# Patient Record
(demographics unavailable — no encounter records)

---

## 2021-01-11 NOTE — ULT
PRELIMINARY REPORT/[facility]/EMERGENCY AFTER HOURS PROCEDURE:



EXAM: US Duplex bilateral Lower Extremity Veins. 



CLINICAL HISTORY: HX: BLE PAIN. SEE NOTES ON LAST IMAGE. THANKS 



TECHNIQUE: Real-time ultrasound scan of the veins of the bilateral lower extremity with color Doppler
 flow, spectral waveform analysis and compression. 



COMPARISON: None provided. 



FINDINGS: 

DEEP VEINS: The common femoral, femoral, and popliteal veins are echolucent and compressible. These v
essels demonstrate respiratory variation and augmentation. There is normal color Doppler flow

throughout. The visualized calf veins are also patent. 

SUPERFICIAL VEINS: The visualized greater saphenous vein is patent. 

SOFT TISSUES: No popliteal fossa cyst or other abnormalities. 



IMPRESSION: No deep venous thrombosis in the bilateral lower extremity.



ELECTRONICALLY SIGNED BY:

Randell Sales MD

Jan 11, 2021 7:05:08 AM CST





FINAL REPORT



BILATERAL LOWER EXTREMITY VENOUS ULTRASOUND:



HISTORY: 

Pain. Edema.



COMPARISON: 

None.



TECHNIQUE: 

Multiplanar grayscale and color Doppler images were obtained in a bilateral lower extremity venous ul
trasound. Spectral analysis of the Doppler waveforms were performed.



FINDINGS: 

The bilateral common femoral vein, profunda femoral veins, superficial femoral veins, and popliteal v
eins are normal in appearance without visible thrombus. These vessels demonstrate normal

compression, flow, and augmentation.



The bilateral posterior tibial veins, profunda femoral veins and greater saphenous veins are patent w
ithout evidence of DVT.



IMPRESSION:

1. This report is in agreement with initial report by [facility].

2. No evidence of DVT in the left or right lower extremity.



Transcribed Date/Time: 1/11/2021 7:50 AM



Reported By: Clif Rain 

Electronically Signed:  1/11/2021 7:55 AM